# Patient Record
Sex: FEMALE | Race: WHITE | NOT HISPANIC OR LATINO | Employment: FULL TIME | URBAN - METROPOLITAN AREA
[De-identification: names, ages, dates, MRNs, and addresses within clinical notes are randomized per-mention and may not be internally consistent; named-entity substitution may affect disease eponyms.]

---

## 2021-04-08 DIAGNOSIS — Z23 ENCOUNTER FOR IMMUNIZATION: ICD-10-CM

## 2022-05-30 NOTE — PATIENT INSTRUCTIONS
- Maintain healthy weight with BMI ideally between 18-25     - Eat a healthy diet, including multiple servings of vegetables and fruits, as well as lean protein sources  - Get at least 150 minutes of moderate aerobic activity or 75 minutes of vigorous aerobic activity a week, or a combination of moderate and vigorous activity  Greater amounts of exercise will provide an even greater health benefit  - Ensure diet provides 1200mg of Calcium daily (divided) and 800IU of vitamin D, or take supplements to meet this  - Safe sex practices recommended  For heavy periods - try Ibuprofen 600mg every 6 hours on heavy days  - if not effective, call for prescription for Tranexamic Acid to take up to 5 days a month on heavy days

## 2022-05-31 ENCOUNTER — ANNUAL EXAM (OUTPATIENT)
Dept: OBGYN CLINIC | Facility: CLINIC | Age: 37
End: 2022-05-31
Payer: COMMERCIAL

## 2022-05-31 VITALS
HEIGHT: 63 IN | SYSTOLIC BLOOD PRESSURE: 100 MMHG | WEIGHT: 108 LBS | DIASTOLIC BLOOD PRESSURE: 60 MMHG | BODY MASS INDEX: 19.14 KG/M2

## 2022-05-31 DIAGNOSIS — N92.0 MENORRHAGIA WITH REGULAR CYCLE: ICD-10-CM

## 2022-05-31 DIAGNOSIS — Z01.419 ENCOUNTER FOR ANNUAL ROUTINE GYNECOLOGICAL EXAMINATION: Primary | ICD-10-CM

## 2022-05-31 PROCEDURE — 99395 PREV VISIT EST AGE 18-39: CPT | Performed by: OBSTETRICS & GYNECOLOGY

## 2022-05-31 RX ORDER — DIPHENOXYLATE HYDROCHLORIDE AND ATROPINE SULFATE 2.5; .025 MG/1; MG/1
1 TABLET ORAL DAILY
COMMUNITY

## 2022-05-31 NOTE — PROGRESS NOTES
Annual Wellness Visit  [address], [address], [facility], [address] [facility]    ASSESSMENT/PLAN: Deanna Cuba is a 40 y o  [id number] who presents for annual gynecologic exam     Encounter for routine gynecologic examination  - Routine well woman exam completed today  - Cervical Cancer Screening: Current ASCCP Guidelines reviewed  Last Pap: 01/25/2021 normal  Next Pap Due: 2 years, routine   - HPV Vaccination status: Immunization series complete  - STI screening offered including HIV testing: offered, pt declined  - Contraceptive counseling discussed  Current contraception: vasectomy,   - The following were reviewed in today's visit: use and side effects of OCPs, exercise and healthy diet    Additional problems addressed during this visit:  1  Encounter for annual routine gynecological examination    2  Menorrhagia with regular cycle  Assessment & Plan: For heavy periods - try Ibuprofen 600mg every 6 hours on heavy days  - if not effective, call for prescription for Tranexamic Acid to take up to 5 days a month on heavy days  Next visit: 1 year Wellness      CC:  Annual Gynecologic Examination    HPI: Deanna Cuba is a 40 y o  [id number] who presents for annual gynecologic examination  She denies any breast, urinary or pelvic issues at today's visit  Periods monthly,  got vasectomy and has been off OCPs for about 18 months  Periods much heavier off OCPs  Last full 7 days  Gyn History  Patient's last menstrual period was 05/09/2022  Last Pap: 01/25/2021 was normal    She  reports being sexually active and has had partner(s) who are male  She reports using the following method of birth control/protection: Male Sterilization         OB History  [id number]    Past Medical History:  No date: BALDEMAR (stress urinary incontinence, female)     Past Surgical History:  2003: WISDOM TOOTH EXTRACTION     Family History   Problem Relation Age of Onset    Breast cancer Maternal Grandmother 79    Colon cancer Neg Hx         Social History     Tobacco Use    Smoking status: Never Smoker    Smokeless tobacco: Never Used   Vaping Use    Vaping Use: Never used   Substance Use Topics    Alcohol use: Yes    Drug use: Never          Current Outpatient Medications:     multivitamin (THERAGRAN) TABS, Take 1 tablet by mouth daily, Disp: , Rfl:     She has No Known Allergies       ROS negative except as noted in HPI    Objective:  /60 (BP Location: Left arm, Patient Position: Sitting, Cuff Size: Standard)   Ht 5' 3" (1 6 m)   Wt 49 kg (108 lb)   LMP 05/09/2022   BMI 19 13 kg/m²      Physical Exam  Constitutional:       Appearance: Normal appearance  Chest:   Breasts:      Right: Normal  No mass, tenderness or axillary adenopathy  Left: Normal  No mass, tenderness or axillary adenopathy  Abdominal:      Palpations: Abdomen is soft  Tenderness: There is no abdominal tenderness  Genitourinary:     General: Normal vulva  Vagina: No bleeding or lesions  Cervix: Normal       Uterus: Normal  Not tender  Adnexa:         Right: No mass or tenderness  Left: No mass or tenderness  Rectum: External hemorrhoid present  Musculoskeletal:         General: Normal range of motion  Lymphadenopathy:      Upper Body:      Right upper body: No axillary adenopathy  Left upper body: No axillary adenopathy  Neurological:      Mental Status: She is alert and oriented to person, place, and time     Psychiatric:         Mood and Affect: Mood normal          Behavior: Behavior normal

## 2022-05-31 NOTE — LETTER
May 31, 2022     Chris [address]  [address] [place], [place] [address]in [facility] [id number]    Patient: Luz Hatch   YOB: 1985   Date of Visit: 5/31/2022       Dear Dr Royal Royal: Thank you for referring Gt Lo to me for evaluation  Below are my notes for this consultation  If you have questions, please do not hesitate to call me  I look forward to following your patient along with you  Sincerely,        Crispin Perales MD        CC: No Recipients  Crispin Perales MD  5/31/2022  1:18 PM  Sign when Signing Visit  Annual Wellness Visit  [address], [address], [facility], [address] [facility] [address]    ASSESSMENT/PLAN: Luz Hatch is a 40 y o  [id number] who presents for annual gynecologic exam     Encounter for routine gynecologic examination  - Routine well woman exam completed today  - Cervical Cancer Screening: Current ASCCP Guidelines reviewed  Last Pap: 01/25/2021 normal  Next Pap Due: 2 years, routine   - HPV Vaccination status: Immunization series complete  - STI screening offered including HIV testing: offered, pt declined  - Contraceptive counseling discussed  Current contraception: vasectomy,   - The following were reviewed in today's visit: use and side effects of OCPs, exercise and healthy diet    Additional problems addressed during this visit:  1  Encounter for annual routine gynecological examination    2  Menorrhagia with regular cycle  Assessment & Plan: For heavy periods - try Ibuprofen 600mg every 6 hours on heavy days  - if not effective, call for prescription for Tranexamic Acid to take up to 5 days a month on heavy days  Next visit: 1 year Wellness      CC:  Annual Gynecologic Examination    HPI: Luz Hatch is a 40 y o  [id number] who presents for annual gynecologic examination  She denies any breast, urinary or pelvic issues at today's visit      Periods monthly,  got vasectomy and has been off OCPs for about 18 months  Periods much heavier off OCPs  Last full 7 days  Gyn History  Patient's last menstrual period was 05/09/2022  Last Pap: 01/25/2021 was normal    She  reports being sexually active and has had partner(s) who are male  She reports using the following method of birth control/protection: Male Sterilization  OB History  [id number]    Past Medical History:  No date: BALDEMAR (stress urinary incontinence, female)     Past Surgical History:  2003: WISDOM TOOTH EXTRACTION     Family History   Problem Relation Age of Onset    Breast cancer Maternal Grandmother 79    Colon cancer Neg Hx         Social History     Tobacco Use    Smoking status: Never Smoker    Smokeless tobacco: Never Used   Vaping Use    Vaping Use: Never used   Substance Use Topics    Alcohol use: Yes    Drug use: Never          Current Outpatient Medications:     multivitamin (THERAGRAN) TABS, Take 1 tablet by mouth daily, Disp: , Rfl:     She has No Known Allergies       ROS negative except as noted in HPI    Objective:  /60 (BP Location: Left arm, Patient Position: Sitting, Cuff Size: Standard)   Ht 5' 3" (1 6 m)   Wt 49 kg (108 lb)   LMP 05/09/2022   BMI 19 13 kg/m²      Physical Exam  Constitutional:       Appearance: Normal appearance  Chest:   Breasts:      Right: Normal  No mass, tenderness or axillary adenopathy  Left: Normal  No mass, tenderness or axillary adenopathy  Abdominal:      Palpations: Abdomen is soft  Tenderness: There is no abdominal tenderness  Genitourinary:     General: Normal vulva  Vagina: No bleeding or lesions  Cervix: Normal       Uterus: Normal  Not tender  Adnexa:         Right: No mass or tenderness  Left: No mass or tenderness  Rectum: External hemorrhoid present  Musculoskeletal:         General: Normal range of motion  Lymphadenopathy:      Upper Body:      Right upper body: No axillary adenopathy  Left upper body: No axillary adenopathy  Neurological:      Mental Status: She is alert and oriented to person, place, and time     Psychiatric:         Mood and Affect: Mood normal          Behavior: Behavior normal

## 2022-05-31 NOTE — ASSESSMENT & PLAN NOTE
For heavy periods - try Ibuprofen 600mg every 6 hours on heavy days  - if not effective, call for prescription for Tranexamic Acid to take up to 5 days a month on heavy days

## 2022-06-01 ENCOUNTER — VBI (OUTPATIENT)
Dept: ADMINISTRATIVE | Facility: OTHER | Age: 37
End: 2022-06-01

## 2022-06-01 NOTE — TELEPHONE ENCOUNTER
Upon review of the In Basket request we were able to locate, review, and update the patient chart as requested for Pap Smear (HPV) aka Cervical Cancer Screening  Any additional questions or concerns should be emailed to the Practice Liaisons via [email or web address] email, please do not reply via In Basket      Thank you  Rolo Howell

## 2023-08-25 ENCOUNTER — ANNUAL EXAM (OUTPATIENT)
Dept: OBGYN CLINIC | Facility: CLINIC | Age: 38
End: 2023-08-25
Payer: COMMERCIAL

## 2023-08-25 VITALS
HEIGHT: 63 IN | SYSTOLIC BLOOD PRESSURE: 90 MMHG | WEIGHT: 110.4 LBS | DIASTOLIC BLOOD PRESSURE: 62 MMHG | BODY MASS INDEX: 19.56 KG/M2

## 2023-08-25 DIAGNOSIS — N92.0 MENORRHAGIA WITH REGULAR CYCLE: ICD-10-CM

## 2023-08-25 DIAGNOSIS — Z01.419 ENCOUNTER FOR ANNUAL ROUTINE GYNECOLOGICAL EXAMINATION: Primary | ICD-10-CM

## 2023-08-25 PROCEDURE — 99395 PREV VISIT EST AGE 18-39: CPT | Performed by: OBSTETRICS & GYNECOLOGY

## 2023-08-25 RX ORDER — SODIUM FLUORIDE 6 MG/ML
PASTE, DENTIFRICE DENTAL
COMMUNITY
Start: 2023-05-26

## 2023-08-25 RX ORDER — TRANEXAMIC ACID 650 MG/1
1300 TABLET ORAL 3 TIMES DAILY
Qty: 30 TABLET | Refills: 3 | Status: SHIPPED | OUTPATIENT
Start: 2023-08-25

## 2023-08-25 RX ORDER — PREDNISONE 20 MG/1
TABLET ORAL
COMMUNITY
Start: 2023-08-22

## 2023-08-25 NOTE — LETTER
August 25, 2023     Yadiel Dyer, [address] [facility] [id number]    Patient: Akila Wheatley   YOB: 1985   Date of Visit: 8/25/2023       Dear Dr. Juan F Naylor: Thank you for referring Bipin Mcleod to me for evaluation. Below are my notes for this consultation. If you have questions, please do not hesitate to call me. I look forward to following your patient along with you. Sincerely,        Vishnu Meza MD        CC: No Recipients    Vishnu Meza MD  8/25/2023  9:07 AM  Sign when Signing Visit  Annual [address]  [address] [facility], [address], [facility], [facility] [facility],    ASSESSMENT/PLAN: Akila Wheatley is a 41 y.o. [id number] who presents for annual gynecologic exam.    Encounter for routine gynecologic examination  - Routine well woman exam completed today. - Cervical Cancer Screening: Current ASCCP Guidelines reviewed. Last Pap: 01/25/2021 normal. Next Pap Due: 1year , routine.  - HPV Vaccination status: Immunization series complete  - STI screening offered including HIV testing: offered, pt declined  - Contraceptive counseling discussed. Current contraception: vasectomy,   - The following were reviewed in today's visit: family planning choices, exercise and healthy diet    Additional problems addressed during this visit:  1. Encounter for annual routine gynecological examination    2. Menorrhagia with regular cycle  Assessment & Plan:  Periods still very heavy for 3 days a month. Tried scheduled Motrin but no much improvement. Interested in [place]. Script given. Will return in 2-3 months for f/u. Orders:  -     Tranexamic Acid 650 MG TABS; Take 2 tablets (1,300 mg total) by mouth 3 (three) times a day , for up to five days a month during heavy days of period.       Next visit: 2-3 months - medication f/u; 1 year Wellness visit      CC:  Annual Gynecologic Examination    HPI: Chanel Donato Chantel Brooks is a 41 y.o. [id number] who presents for annual gynecologic examination. She denies any breast, urinary or pelvic issues at today's visit. Periods monthly but still very heavy first 3 days. Sometimes interferes with activities. Partner with vasectomy, no new partners. Currently treated for poison ivy with 6 day course of Prednisone. Gyn History  Patient's last menstrual period was 2023 (exact date). Last Pap: 2021 was normal    She  reports being sexually active and has had partner(s) who are male. She reports using the following method of birth control/protection: Male Sterilization. OB History      Past Medical History:  2023: Poison ivy  No date: BALDEMAR (stress urinary incontinence, female)  No date: Varicella      Comment:  Childhood     Past Surgical History:  : WISDOM TOOTH EXTRACTION     Family History   Problem Relation Age of Onset   • Breast cancer Maternal Grandmother 79   • Colon cancer Neg Hx    • Uterine cancer Neg Hx    • Ovarian cancer Neg Hx         Social History     Tobacco Use   • Smoking status: Never   • Smokeless tobacco: Never   Vaping Use   • Vaping Use: Never used   Substance Use Topics   • Alcohol use: Yes     Comment: occasional   • Drug use: Never          Current Outpatient Medications:   •  multivitamin (THERAGRAN) TABS, Take 1 tablet by mouth daily, Disp: , Rfl:   •  predniSONE 20 mg tablet, TAKE 2 TABS ORALLY DAILY TILL FINISHED, Disp: , Rfl:   •  Sodium Fluoride 5000 PPM 1.1 % PSTE, USE TWICE DAILY FOR BRUSHING, DO NOT RINSE, Disp: , Rfl:   •  Tranexamic Acid 650 MG TABS, Take 2 tablets (1,300 mg total) by mouth 3 (three) times a day , for up to five days a month during heavy days of period. , Disp: 30 tablet, Rfl: 3    She has No Known Allergies. .    ROS negative except as noted in HPI    Objective:  BP 90/62   Ht 5' 2.5" (1.588 m)   Wt 50.1 kg (110 lb 6.4 oz)   LMP 2023 (Exact Date)   Breastfeeding No   BMI 19.87 kg/m²     Physical Exam  Constitutional:       Appearance: Normal appearance. Chest:   Breasts:     Right: Normal. No mass or tenderness. Left: Normal. No mass or tenderness. Abdominal:      Palpations: Abdomen is soft. Tenderness: There is no abdominal tenderness. Genitourinary:     General: Normal vulva. Vagina: Bleeding (small menstrual blood) present. No lesions. Cervix: Normal.      Uterus: Normal. Not tender. Adnexa:         Right: No mass or tenderness. Left: No mass or tenderness. Rectum: No external hemorrhoid. Musculoskeletal:         General: Normal range of motion. Lymphadenopathy:      Upper Body:      Right upper body: No axillary adenopathy. Left upper body: No axillary adenopathy. Neurological:      Mental Status: She is alert and oriented to person, place, and time.    Psychiatric:         Mood and Affect: Mood normal.         Behavior: Behavior normal.

## 2023-08-25 NOTE — ASSESSMENT & PLAN NOTE
Periods still very heavy for 3 days a month. Tried scheduled Motrin but no much improvement. Interested in [place]. Script given. Will return in 2-3 months for f/u.

## 2023-08-25 NOTE — PROGRESS NOTES
Annual Wellness Visit  [address], [address], [facility], [address] [facility],[address]    ASSESSMENT/PLAN: Sakshi Ochoa is a 41 y.o. [id number] who presents for annual gynecologic exam.    Encounter for routine gynecologic examination  - Routine well woman exam completed today. - Cervical Cancer Screening: Current ASCCP Guidelines reviewed. Last Pap: 01/25/2021 normal. Next Pap Due: 1year , routine.  - HPV Vaccination status: Immunization series complete  - STI screening offered including HIV testing: offered, pt declined  - Contraceptive counseling discussed. Current contraception: vasectomy,   - The following were reviewed in today's visit: family planning choices, exercise and healthy diet    Additional problems addressed during this visit:  1. Encounter for annual routine gynecological examination    2. Menorrhagia with regular cycle  Assessment & Plan:  Periods still very heavy for 3 days a month. Tried scheduled Motrin but no much improvement. Interested in [place]. Script given. Will return in 2-3 months for f/u. Orders:  -     Tranexamic Acid 650 MG TABS; Take 2 tablets (1,300 mg total) by mouth 3 (three) times a day , for up to five days a month during heavy days of period. Next visit: 2-3 months - medication f/u; 1 year Wellness visit      CC:  Annual Gynecologic Examination    HPI: Sakshi Ochoa is a 41 y.o. [id number] who presents for annual gynecologic examination. She denies any breast, urinary or pelvic issues at today's visit. Periods monthly but still very heavy first 3 days. Sometimes interferes with activities. Partner with vasectomy, no new partners. Currently treated for poison ivy with 6 day course of Prednisone. Gyn History  Patient's last menstrual period was 08/23/2023 (exact date). Last Pap: 01/25/2021 was normal    She  reports being sexually active and has had partner(s) who are male.  She reports using the following method of birth control/protection: Male Sterilization. OB History      Past Medical History:  2023: Poison ivy  No date: BALDEMAR (stress urinary incontinence, female)  No date: Varicella      Comment:  Childhood     Past Surgical History:  : WISDOM TOOTH EXTRACTION     Family History   Problem Relation Age of Onset   • Breast cancer Maternal Grandmother 79   • Colon cancer Neg Hx    • Uterine cancer Neg Hx    • Ovarian cancer Neg Hx         Social History     Tobacco Use   • Smoking status: Never   • Smokeless tobacco: Never   Vaping Use   • Vaping Use: Never used   Substance Use Topics   • Alcohol use: Yes     Comment: occasional   • Drug use: Never          Current Outpatient Medications:   •  multivitamin (THERAGRAN) TABS, Take 1 tablet by mouth daily, Disp: , Rfl:   •  predniSONE 20 mg tablet, TAKE 2 TABS ORALLY DAILY TILL FINISHED, Disp: , Rfl:   •  Sodium Fluoride 5000 PPM 1.1 % PSTE, USE TWICE DAILY FOR BRUSHING, DO NOT RINSE, Disp: , Rfl:   •  Tranexamic Acid 650 MG TABS, Take 2 tablets (1,300 mg total) by mouth 3 (three) times a day , for up to five days a month during heavy days of period. , Disp: 30 tablet, Rfl: 3    She has No Known Allergies. .    ROS negative except as noted in HPI    Objective:  BP 90/62   Ht 5' 2.5" (1.588 m)   Wt 50.1 kg (110 lb 6.4 oz)   LMP 2023 (Exact Date)   Breastfeeding No   BMI 19.87 kg/m²      Physical Exam  Constitutional:       Appearance: Normal appearance. Chest:   Breasts:     Right: Normal. No mass or tenderness. Left: Normal. No mass or tenderness. Abdominal:      Palpations: Abdomen is soft. Tenderness: There is no abdominal tenderness. Genitourinary:     General: Normal vulva. Vagina: Bleeding (small menstrual blood) present. No lesions. Cervix: Normal.      Uterus: Normal. Not tender. Adnexa:         Right: No mass or tenderness. Left: No mass or tenderness. Rectum: No external hemorrhoid. Musculoskeletal:         General: Normal range of motion. Lymphadenopathy:      Upper Body:      Right upper body: No axillary adenopathy. Left upper body: No axillary adenopathy. Neurological:      Mental Status: She is alert and oriented to person, place, and time.    Psychiatric:         Mood and Affect: Mood normal.         Behavior: Behavior normal.

## 2023-11-10 ENCOUNTER — OFFICE VISIT (OUTPATIENT)
Dept: OBGYN CLINIC | Facility: CLINIC | Age: 38
End: 2023-11-10

## 2023-11-10 VITALS
WEIGHT: 111 LBS | DIASTOLIC BLOOD PRESSURE: 54 MMHG | SYSTOLIC BLOOD PRESSURE: 96 MMHG | BODY MASS INDEX: 19.67 KG/M2 | HEIGHT: 63 IN

## 2023-11-10 DIAGNOSIS — N92.0 MENORRHAGIA WITH REGULAR CYCLE: Primary | ICD-10-CM

## 2023-11-10 RX ORDER — TRANEXAMIC ACID 650 MG/1
1300 TABLET ORAL 3 TIMES DAILY
Qty: 30 TABLET | Refills: 8 | Status: SHIPPED | OUTPATIENT
Start: 2023-11-10

## 2023-11-10 NOTE — ASSESSMENT & PLAN NOTE
Has used Lysteda for last few periods and much better. Wishes to continue. Refills provided until next Wellness.

## 2023-11-10 NOTE — PROGRESS NOTES
[address]  [address] [facility], [address], [place], [address] [facility]    Assessment/Plan:  Julio César Bright is a 41y.o. year old  who presents for follow up on medication. 1. Menorrhagia with regular cycle  Assessment & Plan:  Has used Lysteda for last few periods and much better. Wishes to continue. Refills provided until next Wellness. Orders:  -     Tranexamic Acid 650 MG TABS; Take 2 tablets (1,300 mg total) by mouth 3 (three) times a day , for up to five days a month during heavy days of period. Next Exam: 2024 [place] due    Subjective:     CC: bleeding improved    HPI: Danilo Darby is a 41 y.o. who presents for medication f/u. At [address] [place] [address] in 2023, pt c/o heavy periods. Gave Lysteda to try with menses after motrin not helpful. She states has used last few cycles. Uses for 3 days with significant improvement in amount of bleeding. Happy with it. No side effects or issues. The following portions of the patient's history were reviewed and updated as appropriate: allergies, current medications, past family history, past medical history, obstetric history, gynecologic history, past social history, past surgical history and problem list.    ROS: Review of Systems   Constitutional: Negative. Gastrointestinal: Negative. Genitourinary: Negative. Psychiatric/Behavioral: Negative. Current Outpatient Medications:     multivitamin (THERAGRAN) TABS, Take 1 tablet by mouth daily, Disp: , Rfl:     Sodium Fluoride 5000 PPM 1.1 % PSTE, USE TWICE DAILY FOR BRUSHING, DO NOT RINSE, Disp: , Rfl:     Tranexamic Acid 650 MG TABS, Take 2 tablets (1,300 mg total) by mouth 3 (three) times a day , for up to five days a month during heavy days of period. , Disp: 30 tablet, Rfl: 8    Objective:  BP 96/54 (BP Location: Left arm, Patient Position: Sitting, Cuff Size: Standard)   Ht 5' 2.75" (1.594 m)   Wt 50.3 kg (111 lb)   LMP 2023 (Approximate)   BMI 19.82 kg/m² Physical Exam  Constitutional:       Appearance: Normal appearance. Neurological:      Mental Status: She is alert and oriented to person, place, and time.    Psychiatric:         Behavior: Behavior normal.

## 2024-08-30 ENCOUNTER — ANNUAL EXAM (OUTPATIENT)
Dept: OBGYN CLINIC | Facility: CLINIC | Age: 39
End: 2024-08-30
Payer: COMMERCIAL

## 2024-08-30 VITALS
BODY MASS INDEX: 19.6 KG/M2 | SYSTOLIC BLOOD PRESSURE: 80 MMHG | HEIGHT: 63 IN | WEIGHT: 110.6 LBS | DIASTOLIC BLOOD PRESSURE: 52 MMHG

## 2024-08-30 DIAGNOSIS — N92.0 MENORRHAGIA WITH REGULAR CYCLE: ICD-10-CM

## 2024-08-30 DIAGNOSIS — Z01.419 ENCOUNTER FOR ANNUAL ROUTINE GYNECOLOGICAL EXAMINATION: Primary | ICD-10-CM

## 2024-08-30 DIAGNOSIS — Z12.4 CERVICAL CANCER SCREENING: ICD-10-CM

## 2024-08-30 PROCEDURE — S0612 ANNUAL GYNECOLOGICAL EXAMINA: HCPCS | Performed by: OBSTETRICS & GYNECOLOGY

## 2024-08-30 RX ORDER — TRANEXAMIC ACID 650 MG/1
1300 TABLET ORAL 3 TIMES DAILY
Qty: 90 TABLET | Refills: 3 | Status: SHIPPED | OUTPATIENT
Start: 2024-08-30

## 2024-08-30 NOTE — LETTER
2024     AMBER Ford  [address]  [address]  [place] [address]    Patient: Ashly Vasquez   YOB: 1985   Date of Visit: 2024       Dear Dr. Almanzar:    Thank you for referring Ashly Vasquez to me for evaluation. Below are my notes for this consultation.    If you have questions, please do not hesitate to call me. I look forward to following your patient along with you.         Sincerely,        Melissa Hernandez MD        CC: No Recipients    Melissa Hernandez MD  2024  8:13 AM  Sign when Signing Visit  Annual Wellness Visit  [facility] - [address], [address], [place] [address]    ASSESSMENT/PLAN: Ashly Vasquez is a 39 y.o.  who presents for annual gynecologic exam.    Encounter for routine gynecologic examination  - Routine well woman exam completed today.  - Cervical Cancer Screening: Current ASCCP Guidelines reviewed. Last Pap: 2021 normal.  Past abnormal pap: none.  Next Pap Due: today.  - HPV Vaccination status: Immunization series complete  - STI screening offered including HIV testing: offered, pt declined  - Contraceptive counseling discussed.  Current contraception: vasectomy,   - The following were reviewed in today's visit: exercise and healthy diet    Additional problems addressed during this visit:  1. Encounter for annual routine gynecological examination  2. Cervical cancer screening  -     IGP, Aptima HPV, Rfx 16/18,45  3. Menorrhagia with regular cycle  Assessment & Plan:  Using tranexamic acid about 3-4 days of period to help lighten.  Working well.  Wishes to continue.  Orders:  -     Tranexamic Acid 650 MG TABS; Take 2 tablets (1,300 mg total) by mouth 3 (three) times a day , for up to five days a month during heavy days of period.      Next visit: 1 year Wellness      CC:  Annual Gynecologic Examination    HPI: Ashly Vasquez is a 39 y.o.  who presents for  annual gynecologic examination.  She denies any breast, urinary or pelvic issues at today's visit.    Periods monthly.  Using Tranexamic acid to decrease heaviness flow - working well.  Sexually active.  No new partners.  Vasectomy.        Gyn History  Patient's last menstrual period was 2024 (exact date).     Last Pap: 2021 was normal    She  reports being sexually active and has had partner(s) who are male. She reports using the following method of birth control/protection: Male Sterilization.       OB History      Past Medical History:  No date: BALDEMAR (stress urinary incontinence, female)  No date: Varicella      Comment:  Childhood     Past Surgical History:  2003: WISDOM TOOTH EXTRACTION     Family History   Problem Relation Age of Onset   • No Known Problems Mother    • Hyperlipidemia Father    • No Known Problems Brother    • No Known Problems Daughter    • No Known Problems Son    • Breast cancer Maternal Grandmother 70   • Hyperlipidemia Paternal Grandmother    • Hypertension Paternal Grandmother    • COPD Paternal Grandfather    • Colon cancer Neg Hx    • Uterine cancer Neg Hx    • Ovarian cancer Neg Hx         Social History     Tobacco Use   • Smoking status: Never   • Smokeless tobacco: Never   Vaping Use   • Vaping status: Never Used   Substance Use Topics   • Alcohol use: Yes     Comment: occasional   • Drug use: Never          Current Outpatient Medications:   •  multivitamin (THERAGRAN) TABS, Take 1 tablet by mouth daily, Disp: , Rfl:   •  Sodium Fluoride 5000 PPM 1.1 % PSTE, USE TWICE DAILY FOR BRUSHING, DO NOT RINSE, Disp: , Rfl:   •  Tranexamic Acid 650 MG TABS, Take 2 tablets (1,300 mg total) by mouth 3 (three) times a day , for up to five days a month during heavy days of period., Disp: 90 tablet, Rfl: 3    She has No Known Allergies..    ROS negative except as noted in HPI    Objective:  BP (!) 80/52 (BP Location: Left arm, Patient Position: Sitting, Cuff Size: Standard)   Ht  "5' 2.75\" (1.594 m)   Wt 50.2 kg (110 lb 9.6 oz)   LMP 08/05/2024 (Exact Date)   BMI 19.75 kg/m²      Physical Exam  Constitutional:       Appearance: Normal appearance.   Chest:   Breasts:     Right: Normal. No mass or tenderness.      Left: Normal. No mass or tenderness.   Abdominal:      Palpations: Abdomen is soft.      Tenderness: There is no abdominal tenderness.   Genitourinary:     General: Normal vulva.      Vagina: No bleeding or lesions.      Cervix: Normal.      Uterus: Normal. Not tender.       Adnexa:         Right: No mass or tenderness.          Left: No mass or tenderness.        Rectum: External hemorrhoid present.   Musculoskeletal:         General: Normal range of motion.   Lymphadenopathy:      Upper Body:      Right upper body: No axillary adenopathy.      Left upper body: No axillary adenopathy.   Neurological:      Mental Status: She is alert and oriented to person, place, and time.   Psychiatric:         Mood and Affect: Mood normal.         Behavior: Behavior normal.         "

## 2024-08-30 NOTE — ASSESSMENT & PLAN NOTE
Using tranexamic acid about 3-4 days of period to help lighten.  Working well.  Wishes to continue.

## 2024-08-30 NOTE — PROGRESS NOTES
Annual Wellness Visit  [facility] - [address], [address], [place] [address]    ASSESSMENT/PLAN: Ashly Vasquez is a 39 y.o.  who presents for annual gynecologic exam.    Encounter for routine gynecologic examination  - Routine well woman exam completed today.  - Cervical Cancer Screening: Current ASCCP Guidelines reviewed. Last Pap: 2021 normal.  Past abnormal pap: none.  Next Pap Due: today.  - HPV Vaccination status: Immunization series complete  - STI screening offered including HIV testing: offered, pt declined  - Contraceptive counseling discussed.  Current contraception: vasectomy,   - The following were reviewed in today's visit: exercise and healthy diet    Additional problems addressed during this visit:  1. Encounter for annual routine gynecological examination  2. Cervical cancer screening  -     IGP, Aptima HPV, Rfx 16/18,45  3. Menorrhagia with regular cycle  Assessment & Plan:  Using tranexamic acid about 3-4 days of period to help lighten.  Working well.  Wishes to continue.  Orders:  -     Tranexamic Acid 650 MG TABS; Take 2 tablets (1,300 mg total) by mouth 3 (three) times a day , for up to five days a month during heavy days of period.      Next visit: 1 year Wellness      CC:  Annual Gynecologic Examination    HPI: Ashly Vasquez is a 39 y.o.  who presents for annual gynecologic examination.  She denies any breast, urinary or pelvic issues at today's visit.    Periods monthly.  Using Tranexamic acid to decrease heaviness flow - working well.  Sexually active.  No new partners.  Vasectomy.        Gyn History  Patient's last menstrual period was 2024 (exact date).     Last Pap: 2021 was normal    She  reports being sexually active and has had partner(s) who are male. She reports using the following method of birth control/protection: Male Sterilization.       OB History      Past Medical History:  No date: BALDEMAR  "(stress urinary incontinence, female)  No date: Varicella      Comment:  Childhood     Past Surgical History:  2003: WISDOM TOOTH EXTRACTION     Family History   Problem Relation Age of Onset    No Known Problems Mother     Hyperlipidemia Father     No Known Problems Brother     No Known Problems Daughter     No Known Problems Son     Breast cancer Maternal Grandmother 70    Hyperlipidemia Paternal Grandmother     Hypertension Paternal Grandmother     COPD Paternal Grandfather     Colon cancer Neg Hx     Uterine cancer Neg Hx     Ovarian cancer Neg Hx         Social History     Tobacco Use    Smoking status: Never    Smokeless tobacco: Never   Vaping Use    Vaping status: Never Used   Substance Use Topics    Alcohol use: Yes     Comment: occasional    Drug use: Never          Current Outpatient Medications:     multivitamin (THERAGRAN) TABS, Take 1 tablet by mouth daily, Disp: , Rfl:     Sodium Fluoride 5000 PPM 1.1 % PSTE, USE TWICE DAILY FOR BRUSHING, DO NOT RINSE, Disp: , Rfl:     Tranexamic Acid 650 MG TABS, Take 2 tablets (1,300 mg total) by mouth 3 (three) times a day , for up to five days a month during heavy days of period., Disp: 90 tablet, Rfl: 3    She has No Known Allergies..    ROS negative except as noted in HPI    Objective:  BP (!) 80/52 (BP Location: Left arm, Patient Position: Sitting, Cuff Size: Standard)   Ht 5' 2.75\" (1.594 m)   Wt 50.2 kg (110 lb 9.6 oz)   LMP 08/05/2024 (Exact Date)   BMI 19.75 kg/m²      Physical Exam  Constitutional:       Appearance: Normal appearance.   Chest:   Breasts:     Right: Normal. No mass or tenderness.      Left: Normal. No mass or tenderness.   Abdominal:      Palpations: Abdomen is soft.      Tenderness: There is no abdominal tenderness.   Genitourinary:     General: Normal vulva.      Vagina: No bleeding or lesions.      Cervix: Normal.      Uterus: Normal. Not tender.       Adnexa:         Right: No mass or tenderness.          Left: No mass or " tenderness.        Rectum: External hemorrhoid present.   Musculoskeletal:         General: Normal range of motion.   Lymphadenopathy:      Upper Body:      Right upper body: No axillary adenopathy.      Left upper body: No axillary adenopathy.   Neurological:      Mental Status: She is alert and oriented to person, place, and time.   Psychiatric:         Mood and Affect: Mood normal.         Behavior: Behavior normal.

## 2024-09-03 LAB
CYTOLOGIST CVX/VAG CYTO: NORMAL
DX ICD CODE: NORMAL
HPV GENOTYPE REFLEX: NORMAL
HPV I/H RISK 4 DNA CVX QL PROBE+SIG AMP: NEGATIVE
OTHER STN SPEC: NORMAL
PATH REPORT.FINAL DX SPEC: NORMAL
SL AMB NOTE:: NORMAL
SL AMB SPECIMEN ADEQUACY: NORMAL
SL AMB TEST METHODOLOGY: NORMAL

## 2025-08-05 PROBLEM — M22.41 CHONDROMALACIA OF RIGHT PATELLA: Status: ACTIVE | Noted: 2025-08-05

## 2025-08-20 DIAGNOSIS — N92.0 MENORRHAGIA WITH REGULAR CYCLE: ICD-10-CM

## 2025-08-20 RX ORDER — TRANEXAMIC ACID 650 MG/1
1300 TABLET ORAL 3 TIMES DAILY
Qty: 90 TABLET | Refills: 0 | Status: SHIPPED | OUTPATIENT
Start: 2025-08-20